# Patient Record
Sex: MALE | Race: WHITE | NOT HISPANIC OR LATINO
[De-identification: names, ages, dates, MRNs, and addresses within clinical notes are randomized per-mention and may not be internally consistent; named-entity substitution may affect disease eponyms.]

---

## 2017-04-20 ENCOUNTER — RX RENEWAL (OUTPATIENT)
Age: 42
End: 2017-04-20

## 2017-06-26 ENCOUNTER — APPOINTMENT (OUTPATIENT)
Dept: INTERNAL MEDICINE | Facility: CLINIC | Age: 42
End: 2017-06-26

## 2017-06-26 VITALS
BODY MASS INDEX: 30.61 KG/M2 | WEIGHT: 195 LBS | SYSTOLIC BLOOD PRESSURE: 146 MMHG | DIASTOLIC BLOOD PRESSURE: 88 MMHG | TEMPERATURE: 97.9 F | HEIGHT: 67 IN

## 2017-06-26 DIAGNOSIS — K62.5 HEMORRHAGE OF ANUS AND RECTUM: ICD-10-CM

## 2017-06-26 DIAGNOSIS — Z80.52 FAMILY HISTORY OF MALIGNANT NEOPLASM OF BLADDER: ICD-10-CM

## 2017-08-28 ENCOUNTER — APPOINTMENT (OUTPATIENT)
Dept: INTERNAL MEDICINE | Facility: CLINIC | Age: 42
End: 2017-08-28
Payer: COMMERCIAL

## 2017-08-28 PROCEDURE — 45378 DIAGNOSTIC COLONOSCOPY: CPT

## 2017-09-06 ENCOUNTER — LABORATORY RESULT (OUTPATIENT)
Age: 42
End: 2017-09-06

## 2017-09-12 ENCOUNTER — APPOINTMENT (OUTPATIENT)
Dept: INTERNAL MEDICINE | Facility: CLINIC | Age: 42
End: 2017-09-12
Payer: COMMERCIAL

## 2017-09-12 VITALS
WEIGHT: 201 LBS | DIASTOLIC BLOOD PRESSURE: 80 MMHG | HEIGHT: 67 IN | TEMPERATURE: 97.9 F | SYSTOLIC BLOOD PRESSURE: 130 MMHG | BODY MASS INDEX: 31.55 KG/M2

## 2017-09-12 VITALS — DIASTOLIC BLOOD PRESSURE: 80 MMHG | SYSTOLIC BLOOD PRESSURE: 110 MMHG

## 2017-09-12 PROCEDURE — 90688 IIV4 VACCINE SPLT 0.5 ML IM: CPT

## 2017-09-12 PROCEDURE — 99396 PREV VISIT EST AGE 40-64: CPT | Mod: 25

## 2017-09-12 PROCEDURE — 94010 BREATHING CAPACITY TEST: CPT

## 2017-09-12 PROCEDURE — 93000 ELECTROCARDIOGRAM COMPLETE: CPT

## 2017-09-12 PROCEDURE — G0008: CPT

## 2017-09-13 ENCOUNTER — NON-APPOINTMENT (OUTPATIENT)
Age: 42
End: 2017-09-13

## 2017-10-07 ENCOUNTER — APPOINTMENT (OUTPATIENT)
Dept: INTERNAL MEDICINE | Facility: CLINIC | Age: 42
End: 2017-10-07

## 2018-08-30 ENCOUNTER — RX RENEWAL (OUTPATIENT)
Age: 43
End: 2018-08-30

## 2019-01-23 ENCOUNTER — LABORATORY RESULT (OUTPATIENT)
Age: 44
End: 2019-01-23

## 2019-01-29 ENCOUNTER — APPOINTMENT (OUTPATIENT)
Dept: INTERNAL MEDICINE | Facility: CLINIC | Age: 44
End: 2019-01-29
Payer: COMMERCIAL

## 2019-01-29 ENCOUNTER — NON-APPOINTMENT (OUTPATIENT)
Age: 44
End: 2019-01-29

## 2019-01-29 VITALS
DIASTOLIC BLOOD PRESSURE: 90 MMHG | TEMPERATURE: 98.3 F | BODY MASS INDEX: 32.49 KG/M2 | SYSTOLIC BLOOD PRESSURE: 120 MMHG | HEIGHT: 67 IN | WEIGHT: 207 LBS

## 2019-01-29 VITALS — SYSTOLIC BLOOD PRESSURE: 122 MMHG | DIASTOLIC BLOOD PRESSURE: 80 MMHG

## 2019-01-29 DIAGNOSIS — K21.0 GASTRO-ESOPHAGEAL REFLUX DISEASE WITH ESOPHAGITIS: ICD-10-CM

## 2019-01-29 PROCEDURE — 93000 ELECTROCARDIOGRAM COMPLETE: CPT

## 2019-01-29 PROCEDURE — 99396 PREV VISIT EST AGE 40-64: CPT | Mod: 25

## 2019-01-29 PROCEDURE — 71046 X-RAY EXAM CHEST 2 VIEWS: CPT

## 2019-01-29 PROCEDURE — 94010 BREATHING CAPACITY TEST: CPT

## 2019-01-29 NOTE — ASSESSMENT
[FreeTextEntry1] : \par \par \par ecg----SR; WNL\par cxr---pa/lat---NAPD\par pfts---WNL\par \par imp--hypertension---normotensive on meds\par         hypercholesterolemia---borderline LDLs\par         obesity---BMI=31.42\par         GERD---asx. on ranitidine\par \par plan---FBW reviewed with pt\par           dietary counseling re: weight loss; weight watchers recommended\par           trial Flonase\par           continue current meds\par           annual CPE

## 2019-01-29 NOTE — HEALTH RISK ASSESSMENT
[Excellent] : ~his/her~  mood as  excellent [No falls in past year] : Patient reported no falls in the past year [0] : 2) Feeling down, depressed, or hopeless: Not at all (0) [None] : None [] :  [Feels Safe at Home] : Feels safe at home [Fully functional (bathing, dressing, toileting, transferring, walking, feeding)] : Fully functional (bathing, dressing, toileting, transferring, walking, feeding) [Fully functional (using the telephone, shopping, preparing meals, housekeeping, doing laundry, using] : Fully functional and needs no help or supervision to perform IADLs (using the telephone, shopping, preparing meals, housekeeping, doing laundry, using transportation, managing medications and managing finances) [Discussed at today's visit] : Advance Directives Discussed at today's visit [FreeTextEntry1] : none [] : No [de-identified] : none [de-identified] : occ. 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

## 2019-01-29 NOTE — HISTORY OF PRESENT ILLNESS
[FreeTextEntry1] : here for CPE [de-identified] : \par sinuses bothersome.....congestion; pressure; using hypertonic saline\par \par wants to check titers of childhood vaccines

## 2019-01-29 NOTE — PHYSICAL EXAM
[No Acute Distress] : no acute distress [Well Nourished] : well nourished [Well Developed] : well developed [Well-Appearing] : well-appearing [Normal Sclera/Conjunctiva] : normal sclera/conjunctiva [PERRL] : pupils equal round and reactive to light [EOMI] : extraocular movements intact [Normal Outer Ear/Nose] : the outer ears and nose were normal in appearance [Normal Oropharynx] : the oropharynx was normal [No JVD] : no jugular venous distention [Supple] : supple [No Lymphadenopathy] : no lymphadenopathy [Thyroid Normal, No Nodules] : the thyroid was normal and there were no nodules present [No Respiratory Distress] : no respiratory distress  [Clear to Auscultation] : lungs were clear to auscultation bilaterally [No Accessory Muscle Use] : no accessory muscle use [Normal Rate] : normal rate  [Regular Rhythm] : with a regular rhythm [Normal S1, S2] : normal S1 and S2 [No Murmur] : no murmur heard [No Carotid Bruits] : no carotid bruits [No Abdominal Bruit] : a ~M bruit was not heard ~T in the abdomen [No Varicosities] : no varicosities [Pedal Pulses Present] : the pedal pulses are present [No Edema] : there was no peripheral edema [No Extremity Clubbing/Cyanosis] : no extremity clubbing/cyanosis [No Palpable Aorta] : no palpable aorta [Soft] : abdomen soft [Non Tender] : non-tender [Non-distended] : non-distended [No Masses] : no abdominal mass palpated [No HSM] : no HSM [Normal Bowel Sounds] : normal bowel sounds [Normal Sphincter Tone] : normal sphincter tone [No Mass] : no mass [Rectal Exam - Rectum] : digital rectal exam was normal [Prostate Enlargement] : the prostate was not enlarged [Prostate Tenderness] : the prostate was not tender [No Prostate Nodules] : no prostate nodules [Normal Posterior Cervical Nodes] : no posterior cervical lymphadenopathy [Normal Anterior Cervical Nodes] : no anterior cervical lymphadenopathy [No CVA Tenderness] : no CVA  tenderness [No Spinal Tenderness] : no spinal tenderness [No Joint Swelling] : no joint swelling [Grossly Normal Strength/Tone] : grossly normal strength/tone [No Rash] : no rash [Normal Gait] : normal gait [Coordination Grossly Intact] : coordination grossly intact [No Focal Deficits] : no focal deficits [Deep Tendon Reflexes (DTR)] : deep tendon reflexes were 2+ and symmetric [Normal Affect] : the affect was normal [Normal Insight/Judgement] : insight and judgment were intact [Stool Occult Blood] : stool negative for occult blood

## 2019-06-27 ENCOUNTER — RX RENEWAL (OUTPATIENT)
Age: 44
End: 2019-06-27

## 2019-11-04 ENCOUNTER — TRANSCRIPTION ENCOUNTER (OUTPATIENT)
Age: 44
End: 2019-11-04

## 2019-11-05 ENCOUNTER — MEDICATION RENEWAL (OUTPATIENT)
Age: 44
End: 2019-11-05

## 2019-11-05 RX ORDER — FAMOTIDINE 20 MG/1
20 TABLET, FILM COATED ORAL
Qty: 180 | Refills: 3 | Status: ACTIVE | COMMUNITY
Start: 2019-11-05 | End: 1900-01-01

## 2020-02-27 ENCOUNTER — TRANSCRIPTION ENCOUNTER (OUTPATIENT)
Age: 45
End: 2020-02-27

## 2020-04-08 ENCOUNTER — APPOINTMENT (OUTPATIENT)
Dept: GASTROENTEROLOGY | Facility: CLINIC | Age: 45
End: 2020-04-08

## 2020-06-08 ENCOUNTER — LABORATORY RESULT (OUTPATIENT)
Age: 45
End: 2020-06-08

## 2020-06-08 ENCOUNTER — APPOINTMENT (OUTPATIENT)
Dept: INTERNAL MEDICINE | Facility: CLINIC | Age: 45
End: 2020-06-08
Payer: COMMERCIAL

## 2020-06-08 VITALS
BODY MASS INDEX: 31.39 KG/M2 | HEIGHT: 67 IN | DIASTOLIC BLOOD PRESSURE: 90 MMHG | WEIGHT: 200 LBS | SYSTOLIC BLOOD PRESSURE: 130 MMHG | TEMPERATURE: 98.5 F

## 2020-06-08 VITALS — DIASTOLIC BLOOD PRESSURE: 80 MMHG | SYSTOLIC BLOOD PRESSURE: 132 MMHG

## 2020-06-08 DIAGNOSIS — K21.0 GASTRO-ESOPHAGEAL REFLUX DISEASE WITH ESOPHAGITIS: ICD-10-CM

## 2020-06-08 PROCEDURE — 93000 ELECTROCARDIOGRAM COMPLETE: CPT

## 2020-06-08 PROCEDURE — 99396 PREV VISIT EST AGE 40-64: CPT | Mod: 25

## 2020-06-08 PROCEDURE — 71046 X-RAY EXAM CHEST 2 VIEWS: CPT

## 2020-06-08 RX ORDER — RANITIDINE 150 MG/1
150 TABLET ORAL
Qty: 60 | Refills: 0 | Status: DISCONTINUED | COMMUNITY
Start: 2017-06-26 | End: 2020-06-08

## 2020-06-08 NOTE — PHYSICAL EXAM
[No Acute Distress] : no acute distress [Well Nourished] : well nourished [Well Developed] : well developed [Normal Sclera/Conjunctiva] : normal sclera/conjunctiva [Well-Appearing] : well-appearing [PERRL] : pupils equal round and reactive to light [EOMI] : extraocular movements intact [Normal Outer Ear/Nose] : the outer ears and nose were normal in appearance [Normal Oropharynx] : the oropharynx was normal [No Lymphadenopathy] : no lymphadenopathy [No JVD] : no jugular venous distention [Supple] : supple [No Respiratory Distress] : no respiratory distress  [Thyroid Normal, No Nodules] : the thyroid was normal and there were no nodules present [No Accessory Muscle Use] : no accessory muscle use [Normal Rate] : normal rate  [Clear to Auscultation] : lungs were clear to auscultation bilaterally [Normal S1, S2] : normal S1 and S2 [Regular Rhythm] : with a regular rhythm [No Murmur] : no murmur heard [No Carotid Bruits] : no carotid bruits [No Varicosities] : no varicosities [No Abdominal Bruit] : a ~M bruit was not heard ~T in the abdomen [Pedal Pulses Present] : the pedal pulses are present [No Edema] : there was no peripheral edema [No Palpable Aorta] : no palpable aorta [No Extremity Clubbing/Cyanosis] : no extremity clubbing/cyanosis [Soft] : abdomen soft [Non Tender] : non-tender [Non-distended] : non-distended [No HSM] : no HSM [No Masses] : no abdominal mass palpated [Normal Bowel Sounds] : normal bowel sounds [Rectal Exam - Rectum] : digital rectal exam was normal [Prostate Enlargement] : the prostate was not enlarged [Prostate Tenderness] : the prostate was not tender [Normal Posterior Cervical Nodes] : no posterior cervical lymphadenopathy [No Prostate Nodules] : no prostate nodules [Normal Anterior Cervical Nodes] : no anterior cervical lymphadenopathy [No CVA Tenderness] : no CVA  tenderness [No Spinal Tenderness] : no spinal tenderness [Grossly Normal Strength/Tone] : grossly normal strength/tone [No Joint Swelling] : no joint swelling [Coordination Grossly Intact] : coordination grossly intact [No Rash] : no rash [Normal Gait] : normal gait [No Focal Deficits] : no focal deficits [Deep Tendon Reflexes (DTR)] : deep tendon reflexes were 2+ and symmetric [Normal Affect] : the affect was normal [Normal Insight/Judgement] : insight and judgment were intact

## 2020-06-08 NOTE — ASSESSMENT
[FreeTextEntry1] : \par \par ecg---SR; WNL\par cxr---NAPD\par \par imp--hypertension---normotensive at rest\par         GERD---mildly symptomatic on famotidine\par         obesity---BMI=31; interval 7# weight loss\par         hypercholesterolemia---panel to be checked on statin\par \par plan---FBW per CPE\par            continued weight loss encouraged\par            endoscopy when we resume\par            annual CPE

## 2020-06-08 NOTE — HISTORY OF PRESENT ILLNESS
[FreeTextEntry1] : \par \par here for CPE [de-identified] : \par generally well;\par \par GERD---occ. sx.; on famotidine; no recent change; was sched. for endo but got cancelled due to Covid situation\par \par asthma---only associated with exposure to cats; has albuterol inhaler which he has not used in several years

## 2020-06-08 NOTE — HEALTH RISK ASSESSMENT
[Very Good] : ~his/her~  mood as very good [Yes] : Yes [2 - 4 times a month (2 pts)] : 2-4 times a month (2 points) [1 or 2 (0 pts)] : 1 or 2 (0 points) [Never (0 pts)] : Never (0 points) [No] : In the past 12 months have you used drugs other than those required for medical reasons? No [No falls in past year] : Patient reported no falls in the past year [0] : 2) Feeling down, depressed, or hopeless: Not at all (0) [None] : None [# of Members in Household ___] :  household currently consist of [unfilled] member(s) [With Family] : lives with family [Employed] : employed [I will adhere to the patient's wishes as expressed in the advance directive except as noted below.] : I will adhere to the patient's wishes as expressed in the advance directive except as noted below [] : No [de-identified] : none [de-identified] : walking 4x per week [Change in mental status noted] : No change in mental status noted [de-identified] : ad rakan [Reports changes in dental health] : Reports no changes in dental health [Reports changes in vision] : Reports no changes in vision [ColonoscopyDate] : 08/17 [ColonoscopyComments] : f/u in 5 yrs. 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

## 2020-06-09 ENCOUNTER — TRANSCRIPTION ENCOUNTER (OUTPATIENT)
Age: 45
End: 2020-06-09

## 2020-06-09 DIAGNOSIS — Z11.59 ENCOUNTER FOR SCREENING FOR OTHER VIRAL DISEASES: ICD-10-CM

## 2020-06-09 LAB
ALBUMIN SERPL ELPH-MCNC: 5.2 G/DL
ALP BLD-CCNC: 74 U/L
ALT SERPL-CCNC: 25 U/L
ANION GAP SERPL CALC-SCNC: 18 MMOL/L
APPEARANCE: CLEAR
AST SERPL-CCNC: 21 U/L
BACTERIA: NEGATIVE
BASOPHILS # BLD AUTO: 0.02 K/UL
BASOPHILS NFR BLD AUTO: 0.3 %
BILIRUB SERPL-MCNC: 0.6 MG/DL
BILIRUBIN URINE: NEGATIVE
BLOOD URINE: NEGATIVE
BUN SERPL-MCNC: 11 MG/DL
CALCIUM SERPL-MCNC: 10 MG/DL
CHLORIDE SERPL-SCNC: 99 MMOL/L
CHOLEST SERPL-MCNC: 200 MG/DL
CHOLEST/HDLC SERPL: 3.3 RATIO
CO2 SERPL-SCNC: 22 MMOL/L
COLOR: YELLOW
CREAT SERPL-MCNC: 1.02 MG/DL
EOSINOPHIL # BLD AUTO: 0.12 K/UL
EOSINOPHIL NFR BLD AUTO: 1.9 %
ERYTHROCYTE [SEDIMENTATION RATE] IN BLOOD BY WESTERGREN METHOD: 19 MM/HR
GLUCOSE QUALITATIVE U: NEGATIVE
GLUCOSE SERPL-MCNC: 56 MG/DL
HCT VFR BLD CALC: 48.2 %
HDLC SERPL-MCNC: 61 MG/DL
HGB BLD-MCNC: 15.8 G/DL
HYALINE CASTS: 0 /LPF
IMM GRANULOCYTES NFR BLD AUTO: 0.2 %
KETONES URINE: NEGATIVE
LDLC SERPL CALC-MCNC: 117 MG/DL
LDLC SERPL DIRECT ASSAY-MCNC: 122 MG/DL
LEUKOCYTE ESTERASE URINE: NEGATIVE
LYMPHOCYTES # BLD AUTO: 2.76 K/UL
LYMPHOCYTES NFR BLD AUTO: 44.6 %
MAN DIFF?: NORMAL
MCHC RBC-ENTMCNC: 29.5 PG
MCHC RBC-ENTMCNC: 32.8 GM/DL
MCV RBC AUTO: 89.9 FL
MICROSCOPIC-UA: NORMAL
MONOCYTES # BLD AUTO: 0.36 K/UL
MONOCYTES NFR BLD AUTO: 5.8 %
NEUTROPHILS # BLD AUTO: 2.92 K/UL
NEUTROPHILS NFR BLD AUTO: 47.2 %
NITRITE URINE: NEGATIVE
PH URINE: 6.5
PLATELET # BLD AUTO: 245 K/UL
PLATELET # PLAS AUTO: 182 K/UL
POTASSIUM SERPL-SCNC: 4.2 MMOL/L
PROT SERPL-MCNC: 7.1 G/DL
PROTEIN URINE: NEGATIVE
PSA SERPL-MCNC: 0.55 NG/ML
RBC # BLD: 5.36 M/UL
RBC # FLD: 13.1 %
RED BLOOD CELLS URINE: 1 /HPF
SAVE SPECIMEN: NORMAL
SODIUM SERPL-SCNC: 139 MMOL/L
SPECIFIC GRAVITY URINE: 1.02
SQUAMOUS EPITHELIAL CELLS: 0 /HPF
T4 FREE SERPL-MCNC: 1.2 NG/DL
TRIGL SERPL-MCNC: 106 MG/DL
TSH SERPL-ACNC: 2.16 UIU/ML
UROBILINOGEN URINE: NORMAL
WBC # FLD AUTO: 6.19 K/UL
WHITE BLOOD CELLS URINE: 0 /HPF

## 2020-07-10 ENCOUNTER — NON-APPOINTMENT (OUTPATIENT)
Age: 45
End: 2020-07-10

## 2020-09-05 LAB
ALBUMIN SERPL ELPH-MCNC: 4.7 G/DL
ALP BLD-CCNC: 71 U/L
ALT SERPL-CCNC: 49 U/L
ANION GAP SERPL CALC-SCNC: 15 MMOL/L
AST SERPL-CCNC: 30 U/L
BILIRUB SERPL-MCNC: 0.7 MG/DL
BUN SERPL-MCNC: 10 MG/DL
CALCIUM SERPL-MCNC: 9.8 MG/DL
CHLORIDE SERPL-SCNC: 102 MMOL/L
CHOLEST SERPL-MCNC: 132 MG/DL
CHOLEST/HDLC SERPL: 2.3 RATIO
CO2 SERPL-SCNC: 24 MMOL/L
CREAT SERPL-MCNC: 0.87 MG/DL
GLUCOSE SERPL-MCNC: 58 MG/DL
HDLC SERPL-MCNC: 58 MG/DL
LDLC SERPL CALC-MCNC: 58 MG/DL
LDLC SERPL DIRECT ASSAY-MCNC: 58 MG/DL
POTASSIUM SERPL-SCNC: 4 MMOL/L
PROT SERPL-MCNC: 6.6 G/DL
SAVE SPECIMEN: NORMAL
SODIUM SERPL-SCNC: 141 MMOL/L
TRIGL SERPL-MCNC: 81 MG/DL

## 2020-09-08 ENCOUNTER — TRANSCRIPTION ENCOUNTER (OUTPATIENT)
Age: 45
End: 2020-09-08

## 2020-09-10 ENCOUNTER — APPOINTMENT (OUTPATIENT)
Dept: ULTRASOUND IMAGING | Facility: CLINIC | Age: 45
End: 2020-09-10

## 2020-09-21 ENCOUNTER — TRANSCRIPTION ENCOUNTER (OUTPATIENT)
Age: 45
End: 2020-09-21

## 2020-09-26 ENCOUNTER — OUTPATIENT (OUTPATIENT)
Dept: OUTPATIENT SERVICES | Facility: HOSPITAL | Age: 45
LOS: 1 days | End: 2020-09-26
Payer: COMMERCIAL

## 2020-09-26 ENCOUNTER — APPOINTMENT (OUTPATIENT)
Dept: ULTRASOUND IMAGING | Facility: CLINIC | Age: 45
End: 2020-09-26
Payer: COMMERCIAL

## 2020-09-26 DIAGNOSIS — R94.5 ABNORMAL RESULTS OF LIVER FUNCTION STUDIES: ICD-10-CM

## 2020-09-26 PROCEDURE — 76700 US EXAM ABDOM COMPLETE: CPT

## 2020-09-26 PROCEDURE — 76700 US EXAM ABDOM COMPLETE: CPT | Mod: 26

## 2020-10-06 LAB
ALBUMIN SERPL ELPH-MCNC: 5.1 G/DL
ALP BLD-CCNC: 86 U/L
ALT SERPL-CCNC: 59 U/L
ANION GAP SERPL CALC-SCNC: 22 MMOL/L
AST SERPL-CCNC: 32 U/L
BILIRUB SERPL-MCNC: 0.5 MG/DL
BUN SERPL-MCNC: 13 MG/DL
CALCIUM SERPL-MCNC: 10.1 MG/DL
CHLORIDE SERPL-SCNC: 96 MMOL/L
CO2 SERPL-SCNC: 23 MMOL/L
CREAT SERPL-MCNC: 0.89 MG/DL
GLUCOSE SERPL-MCNC: 23 MG/DL
POTASSIUM SERPL-SCNC: 4.1 MMOL/L
PROT SERPL-MCNC: 7.4 G/DL
SAVE SPECIMEN: NORMAL
SODIUM SERPL-SCNC: 141 MMOL/L

## 2020-11-05 ENCOUNTER — TRANSCRIPTION ENCOUNTER (OUTPATIENT)
Age: 45
End: 2020-11-05

## 2020-11-10 LAB
A1AT SERPL-MCNC: 111 MG/DL
ALBUMIN SERPL ELPH-MCNC: 5 G/DL
ALP BLD-CCNC: 83 U/L
ALT SERPL-CCNC: 52 U/L
ANION GAP SERPL CALC-SCNC: 16 MMOL/L
AST SERPL-CCNC: 26 U/L
BILIRUB SERPL-MCNC: 0.8 MG/DL
BUN SERPL-MCNC: 12 MG/DL
CALCIUM SERPL-MCNC: 10 MG/DL
CERULOPLASMIN SERPL-MCNC: 22 MG/DL
CHLORIDE SERPL-SCNC: 98 MMOL/L
CO2 SERPL-SCNC: 23 MMOL/L
CREAT SERPL-MCNC: 0.85 MG/DL
FERRITIN SERPL-MCNC: 313 NG/ML
GLUCOSE SERPL-MCNC: 93 MG/DL
HBV SURFACE AG SER QL: NONREACTIVE
HCV AB SER QL: NONREACTIVE
HCV S/CO RATIO: 0.07 S/CO
IRON SATN MFR SERPL: 56 %
IRON SERPL-MCNC: 165 UG/DL
MITOCHONDRIA AB SER IF-ACNC: NORMAL
POTASSIUM SERPL-SCNC: 4.5 MMOL/L
PROT SERPL-MCNC: 7 G/DL
SMOOTH MUSCLE AB SER QL IF: ABNORMAL
SODIUM SERPL-SCNC: 138 MMOL/L
TIBC SERPL-MCNC: 292 UG/DL
UIBC SERPL-MCNC: 127 UG/DL

## 2020-11-11 ENCOUNTER — TRANSCRIPTION ENCOUNTER (OUTPATIENT)
Age: 45
End: 2020-11-11

## 2020-11-11 LAB — ANA SER IF-ACNC: NEGATIVE

## 2020-11-12 DIAGNOSIS — Z01.818 ENCOUNTER FOR OTHER PREPROCEDURAL EXAMINATION: ICD-10-CM

## 2020-11-13 ENCOUNTER — APPOINTMENT (OUTPATIENT)
Dept: DISASTER EMERGENCY | Facility: CLINIC | Age: 45
End: 2020-11-13

## 2020-11-14 LAB — SARS-COV-2 N GENE NPH QL NAA+PROBE: NOT DETECTED

## 2020-11-16 ENCOUNTER — APPOINTMENT (OUTPATIENT)
Dept: GASTROENTEROLOGY | Facility: CLINIC | Age: 45
End: 2020-11-16
Payer: COMMERCIAL

## 2020-11-16 ENCOUNTER — LABORATORY RESULT (OUTPATIENT)
Age: 45
End: 2020-11-16

## 2020-11-16 PROCEDURE — 43239 EGD BIOPSY SINGLE/MULTIPLE: CPT

## 2020-11-16 PROCEDURE — 99072 ADDL SUPL MATRL&STAF TM PHE: CPT

## 2020-12-10 ENCOUNTER — LABORATORY RESULT (OUTPATIENT)
Age: 45
End: 2020-12-10

## 2021-01-17 LAB
ALBUMIN SERPL ELPH-MCNC: 5 G/DL
ALP BLD-CCNC: 83 U/L
ALT SERPL-CCNC: 80 U/L
ANION GAP SERPL CALC-SCNC: 15 MMOL/L
AST SERPL-CCNC: 32 U/L
BILIRUB SERPL-MCNC: 0.7 MG/DL
BUN SERPL-MCNC: 12 MG/DL
CALCIUM SERPL-MCNC: 10.2 MG/DL
CHLORIDE SERPL-SCNC: 99 MMOL/L
CO2 SERPL-SCNC: 23 MMOL/L
CREAT SERPL-MCNC: 0.99 MG/DL
GLUCOSE SERPL-MCNC: 86 MG/DL
POTASSIUM SERPL-SCNC: 4.8 MMOL/L
PROT SERPL-MCNC: 7.2 G/DL
SAVE SPECIMEN: NORMAL
SODIUM SERPL-SCNC: 138 MMOL/L

## 2021-02-10 ENCOUNTER — TRANSCRIPTION ENCOUNTER (OUTPATIENT)
Age: 46
End: 2021-02-10

## 2021-05-16 ENCOUNTER — RX RENEWAL (OUTPATIENT)
Age: 46
End: 2021-05-16

## 2021-06-10 LAB
ALBUMIN SERPL ELPH-MCNC: 5 G/DL
ALP BLD-CCNC: 76 U/L
ALT SERPL-CCNC: 34 U/L
ANION GAP SERPL CALC-SCNC: 12 MMOL/L
APPEARANCE: CLEAR
AST SERPL-CCNC: 24 U/L
BACTERIA: NEGATIVE
BASOPHILS # BLD AUTO: 0.02 K/UL
BASOPHILS NFR BLD AUTO: 0.3 %
BILIRUB SERPL-MCNC: 0.6 MG/DL
BILIRUBIN URINE: NEGATIVE
BLOOD URINE: NEGATIVE
BUN SERPL-MCNC: 13 MG/DL
CALCIUM SERPL-MCNC: 9.9 MG/DL
CHLORIDE SERPL-SCNC: 100 MMOL/L
CHOLEST SERPL-MCNC: 199 MG/DL
CO2 SERPL-SCNC: 25 MMOL/L
COLOR: NORMAL
CREAT SERPL-MCNC: 0.98 MG/DL
EOSINOPHIL # BLD AUTO: 0.07 K/UL
EOSINOPHIL NFR BLD AUTO: 1.2 %
ERYTHROCYTE [SEDIMENTATION RATE] IN BLOOD BY WESTERGREN METHOD: 9 MM/HR
GLUCOSE QUALITATIVE U: NEGATIVE
GLUCOSE SERPL-MCNC: 91 MG/DL
HCT VFR BLD CALC: 46.9 %
HDLC SERPL-MCNC: 60 MG/DL
HGB BLD-MCNC: 15.5 G/DL
HYALINE CASTS: 0 /LPF
IMM GRANULOCYTES NFR BLD AUTO: 0.2 %
KETONES URINE: NEGATIVE
LDLC SERPL CALC-MCNC: 117 MG/DL
LDLC SERPL DIRECT ASSAY-MCNC: 123 MG/DL
LEUKOCYTE ESTERASE URINE: NEGATIVE
LYMPHOCYTES # BLD AUTO: 2.34 K/UL
LYMPHOCYTES NFR BLD AUTO: 40 %
MAN DIFF?: NORMAL
MCHC RBC-ENTMCNC: 30 PG
MCHC RBC-ENTMCNC: 33 GM/DL
MCV RBC AUTO: 90.9 FL
MICROSCOPIC-UA: NORMAL
MONOCYTES # BLD AUTO: 0.42 K/UL
MONOCYTES NFR BLD AUTO: 7.2 %
NEUTROPHILS # BLD AUTO: 2.99 K/UL
NEUTROPHILS NFR BLD AUTO: 51.1 %
NITRITE URINE: NEGATIVE
NONHDLC SERPL-MCNC: 139 MG/DL
PH URINE: 6.5
PLATELET # BLD AUTO: 222 K/UL
POTASSIUM SERPL-SCNC: 5.2 MMOL/L
PROT SERPL-MCNC: 7 G/DL
PROTEIN URINE: NEGATIVE
PSA SERPL-MCNC: 0.52 NG/ML
RBC # BLD: 5.16 M/UL
RBC # FLD: 12.9 %
RED BLOOD CELLS URINE: 1 /HPF
SODIUM SERPL-SCNC: 137 MMOL/L
SPECIFIC GRAVITY URINE: 1.01
SQUAMOUS EPITHELIAL CELLS: 0 /HPF
T4 FREE SERPL-MCNC: 1.3 NG/DL
TRIGL SERPL-MCNC: 111 MG/DL
TSH SERPL-ACNC: 1.84 UIU/ML
UROBILINOGEN URINE: NORMAL
WBC # FLD AUTO: 5.85 K/UL
WHITE BLOOD CELLS URINE: 0 /HPF

## 2021-06-14 ENCOUNTER — APPOINTMENT (OUTPATIENT)
Dept: INTERNAL MEDICINE | Facility: CLINIC | Age: 46
End: 2021-06-14
Payer: COMMERCIAL

## 2021-06-14 VITALS
SYSTOLIC BLOOD PRESSURE: 130 MMHG | HEART RATE: 67 BPM | BODY MASS INDEX: 32.02 KG/M2 | DIASTOLIC BLOOD PRESSURE: 75 MMHG | HEIGHT: 67 IN | WEIGHT: 204 LBS | OXYGEN SATURATION: 98 % | TEMPERATURE: 98 F

## 2021-06-14 VITALS — SYSTOLIC BLOOD PRESSURE: 140 MMHG | DIASTOLIC BLOOD PRESSURE: 80 MMHG

## 2021-06-14 VITALS — DIASTOLIC BLOOD PRESSURE: 90 MMHG | SYSTOLIC BLOOD PRESSURE: 152 MMHG

## 2021-06-14 PROCEDURE — 99072 ADDL SUPL MATRL&STAF TM PHE: CPT

## 2021-06-14 PROCEDURE — 71046 X-RAY EXAM CHEST 2 VIEWS: CPT

## 2021-06-14 PROCEDURE — 99396 PREV VISIT EST AGE 40-64: CPT | Mod: 25

## 2021-06-14 PROCEDURE — 93000 ELECTROCARDIOGRAM COMPLETE: CPT

## 2021-06-14 NOTE — PHYSICAL EXAM
[No Acute Distress] : no acute distress [Well Developed] : well developed [Well Nourished] : well nourished [Well-Appearing] : well-appearing [Normal Sclera/Conjunctiva] : normal sclera/conjunctiva [PERRL] : pupils equal round and reactive to light [EOMI] : extraocular movements intact [Normal Outer Ear/Nose] : the outer ears and nose were normal in appearance [Normal Oropharynx] : the oropharynx was normal [No JVD] : no jugular venous distention [No Lymphadenopathy] : no lymphadenopathy [Supple] : supple [Thyroid Normal, No Nodules] : the thyroid was normal and there were no nodules present [No Respiratory Distress] : no respiratory distress  [No Accessory Muscle Use] : no accessory muscle use [Clear to Auscultation] : lungs were clear to auscultation bilaterally [Normal Rate] : normal rate  [Regular Rhythm] : with a regular rhythm [Normal S1, S2] : normal S1 and S2 [No Murmur] : no murmur heard [No Carotid Bruits] : no carotid bruits [No Abdominal Bruit] : a ~M bruit was not heard ~T in the abdomen [No Varicosities] : no varicosities [Pedal Pulses Present] : the pedal pulses are present [No Edema] : there was no peripheral edema [No Palpable Aorta] : no palpable aorta [No Extremity Clubbing/Cyanosis] : no extremity clubbing/cyanosis [Non Tender] : non-tender [Soft] : abdomen soft [Non-distended] : non-distended [No Masses] : no abdominal mass palpated [No HSM] : no HSM [Normal Bowel Sounds] : normal bowel sounds [Prostate Enlargement] : the prostate was not enlarged [Rectal Exam - Rectum] : digital rectal exam was normal [Prostate Tenderness] : the prostate was not tender [No Prostate Nodules] : no prostate nodules [Normal Posterior Cervical Nodes] : no posterior cervical lymphadenopathy [Normal Anterior Cervical Nodes] : no anterior cervical lymphadenopathy [No CVA Tenderness] : no CVA  tenderness [No Spinal Tenderness] : no spinal tenderness [No Joint Swelling] : no joint swelling [Grossly Normal Strength/Tone] : grossly normal strength/tone [No Rash] : no rash [Coordination Grossly Intact] : coordination grossly intact [No Focal Deficits] : no focal deficits [Normal Gait] : normal gait [Deep Tendon Reflexes (DTR)] : deep tendon reflexes were 2+ and symmetric [Normal Affect] : the affect was normal [Normal Insight/Judgement] : insight and judgment were intact

## 2021-06-14 NOTE — HEALTH RISK ASSESSMENT
[Very Good] : ~his/her~  mood as very good [Yes] : Yes [No falls in past year] : Patient reported no falls in the past year [0] : 2) Feeling down, depressed, or hopeless: Not at all (0) [Patient reported colonoscopy was normal] : Patient reported colonoscopy was normal [HIV Test offered] : HIV Test offered [Hepatitis C test offered] : Hepatitis C test offered [None] : None [With Family] : lives with family [Employed] : employed [College] : College [] :  [# Of Children ___] : has [unfilled] children [Sexually Active] : sexually active [Feels Safe at Home] : Feels safe at home [Fully functional (bathing, dressing, toileting, transferring, walking, feeding)] : Fully functional (bathing, dressing, toileting, transferring, walking, feeding) [Fully functional (using the telephone, shopping, preparing meals, housekeeping, doing laundry, using] : Fully functional and needs no help or supervision to perform IADLs (using the telephone, shopping, preparing meals, housekeeping, doing laundry, using transportation, managing medications and managing finances) [Reports normal functional visual acuity (ie: able to read med bottle)] : Reports normal functional visual acuity [Smoke Detector] : smoke detector [Carbon Monoxide Detector] : carbon monoxide detector [Seat Belt] :  uses seat belt [Sunscreen] : uses sunscreen [] : No [de-identified] : anami. (upper endoscopy) [de-identified] : once per week, may have 1-2 glasses [de-identified] : 4x per week....indoor bike [de-identified] : ad rakan [Change in mental status noted] : No change in mental status noted [Reports changes in hearing] : Reports no changes in hearing [High Risk Behavior] : no high risk behavior [Reports changes in vision] : Reports no changes in vision [Reports changes in dental health] : Reports no changes in dental health [Guns at Home] : no guns at home [Travel to Developing Areas] : does not  travel to developing areas [TB Exposure] : is not being exposed to tuberculosis [ColonoscopyDate] : 08/17 [ColonoscopyComments] : 5 yr. 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

## 2021-06-14 NOTE — HISTORY OF PRESENT ILLNESS
[FreeTextEntry1] : \par \par here for CPE [de-identified] : \par generally well;\par \par off statin due to abn. LFTs; hasn't been able to schedule a g.i. consult with Dr. Pinto

## 2021-06-14 NOTE — ASSESSMENT
[FreeTextEntry1] : \par \par ecg done in office for CPE---SR; rsr'; NSCSPT\par cxr done in office for CPE---pa/lat.---NAPD\par \par imp---hypertension--mildly elevated systolic BPs\par          obesity--BMI=31.95; no change\par          abnormal LFTs---currently normal; off statin\par          asthma---quiescent\par \par plan---FBW reviewed with pt.\par            continue off statin pending consultation with Dr. Pinto\par            ? retry different statin\par            dietary counseling re: weight loss\par            to record home BPs and send in\par \par

## 2021-06-15 ENCOUNTER — NON-APPOINTMENT (OUTPATIENT)
Age: 46
End: 2021-06-15

## 2021-06-23 ENCOUNTER — TRANSCRIPTION ENCOUNTER (OUTPATIENT)
Age: 46
End: 2021-06-23

## 2021-06-24 ENCOUNTER — TRANSCRIPTION ENCOUNTER (OUTPATIENT)
Age: 46
End: 2021-06-24

## 2022-04-11 PROBLEM — Z11.59 SCREENING FOR VIRAL DISEASE: Status: ACTIVE | Noted: 2020-06-09

## 2022-05-13 ENCOUNTER — RX RENEWAL (OUTPATIENT)
Age: 47
End: 2022-05-13

## 2022-05-27 ENCOUNTER — RX RENEWAL (OUTPATIENT)
Age: 47
End: 2022-05-27

## 2022-06-16 LAB
ALBUMIN SERPL ELPH-MCNC: 5 G/DL
ALP BLD-CCNC: 75 U/L
ALT SERPL-CCNC: 24 U/L
ANION GAP SERPL CALC-SCNC: 16 MMOL/L
APPEARANCE: CLEAR
AST SERPL-CCNC: 20 U/L
BACTERIA: NEGATIVE
BASOPHILS # BLD AUTO: 0.04 K/UL
BASOPHILS NFR BLD AUTO: 0.7 %
BILIRUB SERPL-MCNC: 0.5 MG/DL
BILIRUBIN URINE: NEGATIVE
BLOOD URINE: NEGATIVE
BUN SERPL-MCNC: 13 MG/DL
CALCIUM SERPL-MCNC: 10.1 MG/DL
CHLORIDE SERPL-SCNC: 97 MMOL/L
CHOLEST SERPL-MCNC: 218 MG/DL
CO2 SERPL-SCNC: 23 MMOL/L
COLOR: NORMAL
CREAT SERPL-MCNC: 0.99 MG/DL
EGFR: 96 ML/MIN/1.73M2
EOSINOPHIL # BLD AUTO: 0.08 K/UL
EOSINOPHIL NFR BLD AUTO: 1.4 %
ERYTHROCYTE [SEDIMENTATION RATE] IN BLOOD BY WESTERGREN METHOD: 15 MM/HR
GLUCOSE QUALITATIVE U: NEGATIVE
GLUCOSE SERPL-MCNC: 93 MG/DL
HCT VFR BLD CALC: 46.9 %
HDLC SERPL-MCNC: 63 MG/DL
HGB BLD-MCNC: 15.6 G/DL
HYALINE CASTS: 0 /LPF
IMM GRANULOCYTES NFR BLD AUTO: 0.2 %
KETONES URINE: NEGATIVE
LDLC SERPL CALC-MCNC: 134 MG/DL
LDLC SERPL DIRECT ASSAY-MCNC: 130 MG/DL
LEUKOCYTE ESTERASE URINE: NEGATIVE
LYMPHOCYTES # BLD AUTO: 2.21 K/UL
LYMPHOCYTES NFR BLD AUTO: 39.7 %
MAN DIFF?: NORMAL
MCHC RBC-ENTMCNC: 29.9 PG
MCHC RBC-ENTMCNC: 33.3 GM/DL
MCV RBC AUTO: 89.8 FL
MICROSCOPIC-UA: NORMAL
MONOCYTES # BLD AUTO: 0.35 K/UL
MONOCYTES NFR BLD AUTO: 6.3 %
NEUTROPHILS # BLD AUTO: 2.88 K/UL
NEUTROPHILS NFR BLD AUTO: 51.7 %
NITRITE URINE: NEGATIVE
NONHDLC SERPL-MCNC: 155 MG/DL
PH URINE: 6.5
PLATELET # BLD AUTO: 250 K/UL
POTASSIUM SERPL-SCNC: 4.8 MMOL/L
PROT SERPL-MCNC: 7.3 G/DL
PROTEIN URINE: NEGATIVE
RBC # BLD: 5.22 M/UL
RBC # FLD: 13 %
RED BLOOD CELLS URINE: 1 /HPF
SODIUM SERPL-SCNC: 136 MMOL/L
SPECIFIC GRAVITY URINE: 1.01
SQUAMOUS EPITHELIAL CELLS: 0 /HPF
TRIGL SERPL-MCNC: 103 MG/DL
UROBILINOGEN URINE: NORMAL
WBC # FLD AUTO: 5.57 K/UL
WHITE BLOOD CELLS URINE: 0 /HPF

## 2022-06-17 LAB
PSA SERPL-MCNC: 0.48 NG/ML
T4 FREE SERPL-MCNC: 1.4 NG/DL
TSH SERPL-ACNC: 2.73 UIU/ML

## 2022-06-20 ENCOUNTER — APPOINTMENT (OUTPATIENT)
Dept: INTERNAL MEDICINE | Facility: CLINIC | Age: 47
End: 2022-06-20
Payer: COMMERCIAL

## 2022-06-20 VITALS — DIASTOLIC BLOOD PRESSURE: 80 MMHG | SYSTOLIC BLOOD PRESSURE: 128 MMHG

## 2022-06-20 VITALS
DIASTOLIC BLOOD PRESSURE: 86 MMHG | SYSTOLIC BLOOD PRESSURE: 122 MMHG | TEMPERATURE: 98 F | HEIGHT: 67 IN | BODY MASS INDEX: 32.33 KG/M2 | WEIGHT: 206 LBS | HEART RATE: 87 BPM | OXYGEN SATURATION: 98 %

## 2022-06-20 DIAGNOSIS — Z00.00 ENCOUNTER FOR GENERAL ADULT MEDICAL EXAMINATION W/OUT ABNORMAL FINDINGS: ICD-10-CM

## 2022-06-20 PROCEDURE — 93000 ELECTROCARDIOGRAM COMPLETE: CPT

## 2022-06-20 PROCEDURE — 99396 PREV VISIT EST AGE 40-64: CPT | Mod: 25

## 2022-06-20 NOTE — PHYSICAL EXAM
[No Acute Distress] : no acute distress [Well Nourished] : well nourished [Well Developed] : well developed [Well-Appearing] : well-appearing [Normal Sclera/Conjunctiva] : normal sclera/conjunctiva [PERRL] : pupils equal round and reactive to light [EOMI] : extraocular movements intact [Normal Outer Ear/Nose] : the outer ears and nose were normal in appearance [Normal Oropharynx] : the oropharynx was normal [No JVD] : no jugular venous distention [No Lymphadenopathy] : no lymphadenopathy [Supple] : supple [Thyroid Normal, No Nodules] : the thyroid was normal and there were no nodules present [No Respiratory Distress] : no respiratory distress  [No Accessory Muscle Use] : no accessory muscle use [Clear to Auscultation] : lungs were clear to auscultation bilaterally [Normal Rate] : normal rate  [Regular Rhythm] : with a regular rhythm [Normal S1, S2] : normal S1 and S2 [No Murmur] : no murmur heard [No Carotid Bruits] : no carotid bruits [No Abdominal Bruit] : a ~M bruit was not heard ~T in the abdomen [No Varicosities] : no varicosities [Pedal Pulses Present] : the pedal pulses are present [No Edema] : there was no peripheral edema [No Palpable Aorta] : no palpable aorta [No Extremity Clubbing/Cyanosis] : no extremity clubbing/cyanosis [Soft] : abdomen soft [Non Tender] : non-tender [Non-distended] : non-distended [No Masses] : no abdominal mass palpated [No HSM] : no HSM [Normal Bowel Sounds] : normal bowel sounds [Rectal Exam - Rectum] : digital rectal exam was normal [Prostate Enlargement] : the prostate was not enlarged [Prostate Tenderness] : the prostate was not tender [No Prostate Nodules] : no prostate nodules [Normal Posterior Cervical Nodes] : no posterior cervical lymphadenopathy [Normal Anterior Cervical Nodes] : no anterior cervical lymphadenopathy [No CVA Tenderness] : no CVA  tenderness [No Spinal Tenderness] : no spinal tenderness [No Joint Swelling] : no joint swelling [Grossly Normal Strength/Tone] : grossly normal strength/tone [No Rash] : no rash [Coordination Grossly Intact] : coordination grossly intact [No Focal Deficits] : no focal deficits [Normal Gait] : normal gait [Deep Tendon Reflexes (DTR)] : deep tendon reflexes were 2+ and symmetric [Normal Affect] : the affect was normal [Normal Insight/Judgement] : insight and judgment were intact

## 2022-06-20 NOTE — ASSESSMENT
[FreeTextEntry1] : \par \par ecg---done in office today for hypertension---SR; WNL\par \par imp---hypertension--normotensive on meds\par          hypercholesterolemia---mildly elevated LDL; off statin\par          obesity----BMI=32.26\par          abnormal LFTs---normal off statin; has mild hepatic steatosis per prior abdominal US\par          asthma---asx.; not using rescue inhaler\par \par plan--FBW reviewed with pt.\par          dietary counseling re: weight loss\par          g.i. consult re: ? resumption of statin tx; ? with different statin\par          annual CPE\par          \par

## 2022-06-20 NOTE — HISTORY OF PRESENT ILLNESS
[FreeTextEntry1] : \par here for CPE [de-identified] : \par hypertension---started on amlodipine 5 mg  about 1 year ago due mildly elevated BPs.\par \par abn. LFTs on statin---remains off statin; consult with Dr. Pinto was deferred.

## 2022-06-20 NOTE — HEALTH RISK ASSESSMENT
[Good] : ~his/her~  mood as  good [Never] : Never [Yes] : Yes [2 - 4 times a month (2 pts)] : 2-4 times a month (2 points) [No falls in past year] : Patient reported no falls in the past year [0] : 2) Feeling down, depressed, or hopeless: Not at all (0) [HIV Test offered] : HIV Test offered [Hepatitis C test offered] : Hepatitis C test offered [None] : None [With Family] : lives with family [# of Members in Household ___] :  household currently consist of [unfilled] member(s) [Employed] : employed [College] : College [] :  [# Of Children ___] : has [unfilled] children [Sexually Active] : sexually active [Feels Safe at Home] : Feels safe at home [Fully functional (bathing, dressing, toileting, transferring, walking, feeding)] : Fully functional (bathing, dressing, toileting, transferring, walking, feeding) [Fully functional (using the telephone, shopping, preparing meals, housekeeping, doing laundry, using] : Fully functional and needs no help or supervision to perform IADLs (using the telephone, shopping, preparing meals, housekeeping, doing laundry, using transportation, managing medications and managing finances) [Reports normal functional visual acuity (ie: able to read med bottle)] : Reports normal functional visual acuity [Smoke Detector] : smoke detector [Carbon Monoxide Detector] : carbon monoxide detector [Seat Belt] :  uses seat belt [Sunscreen] : uses sunscreen [With Patient/Caregiver] : , with patient/caregiver [I will adhere to the patient's wishes.] : I will adhere to the patient's wishes. [de-identified] : none [de-identified] : none [de-identified] : rides indoor bike; walks 4x per week [de-identified] : low carb [TDP6Motgd] : 0 [High Risk Behavior] : no high risk behavior [Reports changes in hearing] : Reports no changes in hearing [Reports changes in vision] : Reports no changes in vision [Reports changes in dental health] : Reports no changes in dental health [Guns at Home] : no guns at home [Travel to Developing Areas] : does not  travel to developing areas [TB Exposure] : is not being exposed to tuberculosis [ColonoscopyDate] : 08/17 [ColonoscopyComments] : 5 yr. f/[id number]` [FreeTextEntry2] :  [AdvancecareDate] : 06/22

## 2022-06-21 ENCOUNTER — NON-APPOINTMENT (OUTPATIENT)
Age: 47
End: 2022-06-21

## 2022-08-06 ENCOUNTER — TRANSCRIPTION ENCOUNTER (OUTPATIENT)
Age: 47
End: 2022-08-06

## 2022-11-11 ENCOUNTER — APPOINTMENT (OUTPATIENT)
Dept: INTERNAL MEDICINE | Facility: CLINIC | Age: 47
End: 2022-11-11

## 2022-11-11 VITALS
WEIGHT: 204 LBS | HEART RATE: 91 BPM | DIASTOLIC BLOOD PRESSURE: 80 MMHG | SYSTOLIC BLOOD PRESSURE: 134 MMHG | HEIGHT: 67 IN | OXYGEN SATURATION: 98 % | BODY MASS INDEX: 32.02 KG/M2

## 2022-11-11 PROCEDURE — 99214 OFFICE O/P EST MOD 30 MIN: CPT

## 2022-11-11 NOTE — ASSESSMENT
[FreeTextEntry1] : imp---hypertension--normotensive on meds\par          hypercholesterolemia---mildly elevated LDL; off statin 2/2 LFTs, will trial rosuvastatin and recheck lipid profile in 8 weeks to monitor LFTs closely \par          obesity----BMI > 30\par          abnormal LFTs---normal off statin; has mild hepatic steatosis per prior abdominal US\par          asthma---asx.; not using rescue inhaler\par \par plan--\par          dietary counseling re: weight loss\par          \par

## 2022-11-11 NOTE — PHYSICAL EXAM
[No Acute Distress] : no acute distress [Well Nourished] : well nourished [Well Developed] : well developed [Well-Appearing] : well-appearing [Normal Sclera/Conjunctiva] : normal sclera/conjunctiva [PERRL] : pupils equal round and reactive to light [EOMI] : extraocular movements intact [Normal Outer Ear/Nose] : the outer ears and nose were normal in appearance [Normal Oropharynx] : the oropharynx was normal [No JVD] : no jugular venous distention [No Lymphadenopathy] : no lymphadenopathy [Supple] : supple [Thyroid Normal, No Nodules] : the thyroid was normal and there were no nodules present [No Respiratory Distress] : no respiratory distress  [No Accessory Muscle Use] : no accessory muscle use [Clear to Auscultation] : lungs were clear to auscultation bilaterally [Normal Rate] : normal rate  [Regular Rhythm] : with a regular rhythm [Normal S1, S2] : normal S1 and S2 [No Murmur] : no murmur heard [No Carotid Bruits] : no carotid bruits [No Abdominal Bruit] : a ~M bruit was not heard ~T in the abdomen [No Varicosities] : no varicosities [Pedal Pulses Present] : the pedal pulses are present [No Edema] : there was no peripheral edema [No Palpable Aorta] : no palpable aorta [No Extremity Clubbing/Cyanosis] : no extremity clubbing/cyanosis [Soft] : abdomen soft [Non Tender] : non-tender [Non-distended] : non-distended [No Masses] : no abdominal mass palpated [No HSM] : no HSM [Normal Bowel Sounds] : normal bowel sounds [Prostate Enlargement] : the prostate was not enlarged [Rectal Exam - Rectum] : digital rectal exam was normal [Prostate Tenderness] : the prostate was not tender [No Prostate Nodules] : no prostate nodules [Normal Posterior Cervical Nodes] : no posterior cervical lymphadenopathy [Normal Anterior Cervical Nodes] : no anterior cervical lymphadenopathy [No CVA Tenderness] : no CVA  tenderness [No Spinal Tenderness] : no spinal tenderness [No Joint Swelling] : no joint swelling [Grossly Normal Strength/Tone] : grossly normal strength/tone [No Rash] : no rash [Coordination Grossly Intact] : coordination grossly intact [No Focal Deficits] : no focal deficits [Normal Gait] : normal gait [Deep Tendon Reflexes (DTR)] : deep tendon reflexes were 2+ and symmetric [Normal Affect] : the affect was normal [Normal Insight/Judgement] : insight and judgment were intact

## 2022-11-11 NOTE — HEALTH RISK ASSESSMENT
[Never] : Never [Yes] : Yes [2 - 4 times a month (2 pts)] : 2-4 times a month (2 points) [No falls in past year] : Patient reported no falls in the past year [0] : 2) Feeling down, depressed, or hopeless: Not at all (0) [With Patient/Caregiver] : , with patient/caregiver [I will adhere to the patient's wishes.] : I will adhere to the patient's wishes. [Good] : ~his/her~  mood as  good [HIV Test offered] : HIV Test offered [Hepatitis C test offered] : Hepatitis C test offered [None] : None [With Family] : lives with family [# of Members in Household ___] :  household currently consist of [unfilled] member(s) [Employed] : employed [College] : College [] :  [# Of Children ___] : has [unfilled] children [Sexually Active] : sexually active [Feels Safe at Home] : Feels safe at home [Fully functional (bathing, dressing, toileting, transferring, walking, feeding)] : Fully functional (bathing, dressing, toileting, transferring, walking, feeding) [Fully functional (using the telephone, shopping, preparing meals, housekeeping, doing laundry, using] : Fully functional and needs no help or supervision to perform IADLs (using the telephone, shopping, preparing meals, housekeeping, doing laundry, using transportation, managing medications and managing finances) [Reports normal functional visual acuity (ie: able to read med bottle)] : Reports normal functional visual acuity [Smoke Detector] : smoke detector [Carbon Monoxide Detector] : carbon monoxide detector [Seat Belt] :  uses seat belt [Sunscreen] : uses sunscreen [de-identified] : none [de-identified] : none [de-identified] : rides indoor bike; walks 4x per week [de-identified] : low carb [[id number]] : 0 [AdvancecareDate] : 06/22 [High Risk Behavior] : no high risk behavior [Reports changes in hearing] : Reports no changes in hearing [Reports changes in vision] : Reports no changes in vision [Reports changes in dental health] : Reports no changes in dental health [Guns at Home] : no guns at home [Travel to Developing Areas] : does not  travel to developing areas [TB Exposure] : is not being exposed to tuberculosis [ColonoscopyDate] : 08/17 [ColonoscopyComments] : 6 yr. [id number] [FreeTextEntry2] :

## 2022-11-11 NOTE — HISTORY OF PRESENT ILLNESS
[de-identified] : \par hypertension--- on amlodipine, lisinopril \par \par hyperlipidemia -- \par abn. LFTs on statin--- remains off statin [FreeTextEntry1] : Patient presents today for follow-up of chronic medical conditions.\par

## 2022-11-11 NOTE — REVIEW OF SYSTEMS
[Patient Intake Form Reviewed] : Patient intake form was reviewed [FreeTextEntry1] : Negative except per HPI/Intake Form\par

## 2022-11-14 LAB
ALBUMIN SERPL ELPH-MCNC: 5 G/DL
ALP BLD-CCNC: 78 U/L
ALT SERPL-CCNC: 28 U/L
ANION GAP SERPL CALC-SCNC: 14 MMOL/L
APO B SERPL-MCNC: 100 MG/DL
AST SERPL-CCNC: 26 U/L
BILIRUB SERPL-MCNC: 0.5 MG/DL
BUN SERPL-MCNC: 15 MG/DL
CALCIUM SERPL-MCNC: 10.4 MG/DL
CHLORIDE SERPL-SCNC: 100 MMOL/L
CHOLEST SERPL-MCNC: 224 MG/DL
CO2 SERPL-SCNC: 24 MMOL/L
CREAT SERPL-MCNC: 0.92 MG/DL
EGFR: 104 ML/MIN/1.73M2
GLUCOSE SERPL-MCNC: 90 MG/DL
HDLC SERPL-MCNC: 59 MG/DL
LDLC SERPL CALC-MCNC: 138 MG/DL
NONHDLC SERPL-MCNC: 164 MG/DL
POTASSIUM SERPL-SCNC: 5.1 MMOL/L
PROT SERPL-MCNC: 7.9 G/DL
SODIUM SERPL-SCNC: 138 MMOL/L
TRIGL SERPL-MCNC: 131 MG/DL

## 2022-12-22 ENCOUNTER — TRANSCRIPTION ENCOUNTER (OUTPATIENT)
Age: 47
End: 2022-12-22

## 2022-12-22 LAB
BASOPHILS # BLD AUTO: 0.04 K/UL
BASOPHILS NFR BLD AUTO: 0.6 %
CHOLEST SERPL-MCNC: 173 MG/DL
EOSINOPHIL # BLD AUTO: 0.08 K/UL
EOSINOPHIL NFR BLD AUTO: 1.3 %
HCT VFR BLD CALC: 49.3 %
HDLC SERPL-MCNC: 71 MG/DL
HGB BLD-MCNC: 16.1 G/DL
IMM GRANULOCYTES NFR BLD AUTO: 0.3 %
LDLC SERPL CALC-MCNC: 85 MG/DL
LYMPHOCYTES # BLD AUTO: 2.61 K/UL
LYMPHOCYTES NFR BLD AUTO: 42.2 %
MAN DIFF?: NORMAL
MCHC RBC-ENTMCNC: 29.9 PG
MCHC RBC-ENTMCNC: 32.7 GM/DL
MCV RBC AUTO: 91.6 FL
MONOCYTES # BLD AUTO: 0.42 K/UL
MONOCYTES NFR BLD AUTO: 6.8 %
NEUTROPHILS # BLD AUTO: 3.02 K/UL
NEUTROPHILS NFR BLD AUTO: 48.8 %
NONHDLC SERPL-MCNC: 102 MG/DL
PLATELET # BLD AUTO: 259 K/UL
RBC # BLD: 5.38 M/UL
RBC # FLD: 13.2 %
TRIGL SERPL-MCNC: 84 MG/DL
WBC # FLD AUTO: 6.19 K/UL

## 2022-12-30 ENCOUNTER — APPOINTMENT (OUTPATIENT)
Dept: INTERNAL MEDICINE | Facility: CLINIC | Age: 47
End: 2022-12-30
Payer: COMMERCIAL

## 2022-12-30 VITALS
BODY MASS INDEX: 31.95 KG/M2 | WEIGHT: 204 LBS | OXYGEN SATURATION: 99 % | SYSTOLIC BLOOD PRESSURE: 140 MMHG | HEART RATE: 77 BPM | DIASTOLIC BLOOD PRESSURE: 80 MMHG

## 2022-12-30 VITALS — DIASTOLIC BLOOD PRESSURE: 70 MMHG | SYSTOLIC BLOOD PRESSURE: 135 MMHG

## 2022-12-30 PROCEDURE — 99214 OFFICE O/P EST MOD 30 MIN: CPT

## 2022-12-30 NOTE — ASSESSMENT
[FreeTextEntry1] : imp---hypertension--normotensive on meds\par          hypercholesterolemia---mildly elevated LDL; off statin 2/2 LFTs, will trial rosuvastatin and recheck lipid profile in 8 weeks to monitor LFTs closely \par          obesity----BMI > 30\par          abnormal LFTs--elevated prior in setting of statins, then normalized after discontinuation - has mild hepatic steatosis per prior abdominal US\par          asthma---asx.; not using rescue inhaler\par \par plan--\par          dietary counseling re: weight loss - will work on portion control\par          \par          \par \par #HTN\par Patient with history of hypertension. \par BP today elevated\par Discussed potentially increase medication regimen - will defer for now\par Patient to continue current regimen as prescribed. Patient instructed to continue monitoring blood pressure at home and to keep a journal. Will continue to monitor patient's bp and adjust his hypertensive regimen as needed.\par \par

## 2022-12-30 NOTE — HISTORY OF PRESENT ILLNESS
[de-identified] : \par hypertension--- on amlodipine, lisinopril \par \par hyperlipidemia -- \par abn. LFTs on statin--- remains off statin [FreeTextEntry1] : Patient presents today for follow-up of chronic medical conditions.\par

## 2022-12-30 NOTE — HEALTH RISK ASSESSMENT
[Never] : Never [Yes] : Yes [2 - 4 times a month (2 pts)] : 2-4 times a month (2 points) [No falls in past year] : Patient reported no falls in the past year [0] : 2) Feeling down, depressed, or hopeless: Not at all (0) [With Patient/Caregiver] : , with patient/caregiver [I will adhere to the patient's wishes.] : I will adhere to the patient's wishes. [Good] : ~his/her~  mood as  good [HIV Test offered] : HIV Test offered [Hepatitis C test offered] : Hepatitis C test offered [None] : None [With Family] : lives with family [# of Members in Household ___] :  household currently consist of [unfilled] member(s) [Employed] : employed [College] : College [] :  [# Of Children ___] : has [unfilled] children [Sexually Active] : sexually active [Feels Safe at Home] : Feels safe at home [Fully functional (bathing, dressing, toileting, transferring, walking, feeding)] : Fully functional (bathing, dressing, toileting, transferring, walking, feeding) [Fully functional (using the telephone, shopping, preparing meals, housekeeping, doing laundry, using] : Fully functional and needs no help or supervision to perform IADLs (using the telephone, shopping, preparing meals, housekeeping, doing laundry, using transportation, managing medications and managing finances) [Reports normal functional visual acuity (ie: able to read med bottle)] : Reports normal functional visual acuity [Smoke Detector] : smoke detector [Carbon Monoxide Detector] : carbon monoxide detector [Seat Belt] :  uses seat belt [Sunscreen] : uses sunscreen [de-identified] : none [de-identified] : none [de-identified] : rides indoor bike; walks 4x per week [de-identified] : low carb [[id number]] : 0 [AdvancecareDate] : 06/22 [High Risk Behavior] : no high risk behavior [Reports changes in hearing] : Reports no changes in hearing [Reports changes in vision] : Reports no changes in vision [Reports changes in dental health] : Reports no changes in dental health [Guns at Home] : no guns at home [Travel to Developing Areas] : does not  travel to developing areas [TB Exposure] : is not being exposed to tuberculosis [ColonoscopyDate] : 08/17 [ColonoscopyComments] : 6 yr. [id number]` [FreeTextEntry2] :

## 2023-01-06 LAB
ALBUMIN SERPL ELPH-MCNC: 4.9 G/DL
ALP BLD-CCNC: 76 U/L
ALT SERPL-CCNC: 44 U/L
ANION GAP SERPL CALC-SCNC: 11 MMOL/L
AST SERPL-CCNC: 26 U/L
BILIRUB SERPL-MCNC: 0.4 MG/DL
BUN SERPL-MCNC: 11 MG/DL
CALCIUM SERPL-MCNC: 9.8 MG/DL
CHLORIDE SERPL-SCNC: 101 MMOL/L
CO2 SERPL-SCNC: 27 MMOL/L
CREAT SERPL-MCNC: 0.82 MG/DL
EGFR: 110 ML/MIN/1.73M2
GLUCOSE SERPL-MCNC: 100 MG/DL
POTASSIUM SERPL-SCNC: 4.4 MMOL/L
PROT SERPL-MCNC: 7.4 G/DL
SODIUM SERPL-SCNC: 140 MMOL/L

## 2023-01-23 ENCOUNTER — TRANSCRIPTION ENCOUNTER (OUTPATIENT)
Age: 48
End: 2023-01-23

## 2023-01-27 ENCOUNTER — APPOINTMENT (OUTPATIENT)
Dept: INTERNAL MEDICINE | Facility: CLINIC | Age: 48
End: 2023-01-27
Payer: COMMERCIAL

## 2023-01-27 PROCEDURE — 99214 OFFICE O/P EST MOD 30 MIN: CPT | Mod: 95

## 2023-01-27 RX ORDER — ATORVASTATIN CALCIUM 20 MG/1
20 TABLET, FILM COATED ORAL DAILY
Qty: 90 | Refills: 3 | Status: DISCONTINUED | COMMUNITY
Start: 2020-06-09 | End: 2023-01-27

## 2023-01-27 NOTE — PHYSICAL EXAM
[de-identified] : Physical exam limited due to nature of telehealth visit - patient in NAD, speaking comfortably in full sentences

## 2023-01-27 NOTE — HISTORY OF PRESENT ILLNESS
[Home] : at home, [unfilled] , at the time of the visit. [Medical Office: ([facility])___] : at the medical office located in  [Verbal consent obtained from patient] : the patient, [unfilled] [de-identified] : \par hypertension--- on amlodipine, lisinopril \par home bp readings persistently elevated - sbp 140s \par \par hyperlipidemia -- \par abn. LFTs on statin in the past - tolerating statin since last visit\par \par otherwise feels well without complaints [FreeTextEntry1] : Patient presents today for follow-up of chronic medical conditions.\par

## 2023-01-27 NOTE — ASSESSMENT
[FreeTextEntry1] : imp---hypertension--as below\par          hypercholesterolemia---as below \par          obesity----BMI > 30\par          abnormal LFTs--elevated prior in setting of statins, then normalized after discontinuation - has mild hepatic steatosis per prior abdominal US\par          asthma---asx.; not using rescue inhaler\par \par plan--\par \par #HTN\par Patient with history of hypertension. \par BP remains elevated per home BP readings\par Will increase lisinopril 10 > 20, amlodipine 5\par Patient to continue current regimen as prescribed. Patient instructed to continue monitoring blood pressure at home and to keep a journal. Will continue to monitor patient's bp and adjust his hypertensive regimen as needed.\par \par #HLD \par lifestyle modification discussed at length\par repeat lipid profile at goal \par given hx of statin induced toxicity - will plan to closely monitor LFTs\par continue statin as prescribed \par

## 2023-05-01 ENCOUNTER — RX RENEWAL (OUTPATIENT)
Age: 48
End: 2023-05-01

## 2023-06-22 ENCOUNTER — TRANSCRIPTION ENCOUNTER (OUTPATIENT)
Age: 48
End: 2023-06-22

## 2023-07-18 ENCOUNTER — TRANSCRIPTION ENCOUNTER (OUTPATIENT)
Age: 48
End: 2023-07-18

## 2023-07-25 ENCOUNTER — TRANSCRIPTION ENCOUNTER (OUTPATIENT)
Age: 48
End: 2023-07-25

## 2023-07-26 LAB
ALBUMIN SERPL ELPH-MCNC: 4.8 G/DL
ALP BLD-CCNC: 79 U/L
ALT SERPL-CCNC: 40 U/L
ANION GAP SERPL CALC-SCNC: 14 MMOL/L
AST SERPL-CCNC: 25 U/L
BILIRUB SERPL-MCNC: 0.4 MG/DL
BUN SERPL-MCNC: 14 MG/DL
CALCIUM SERPL-MCNC: 9.7 MG/DL
CHLORIDE SERPL-SCNC: 102 MMOL/L
CHOLEST SERPL-MCNC: 152 MG/DL
CO2 SERPL-SCNC: 23 MMOL/L
CREAT SERPL-MCNC: 0.99 MG/DL
EGFR: 95 ML/MIN/1.73M2
ESTIMATED AVERAGE GLUCOSE: 108 MG/DL
GLUCOSE SERPL-MCNC: 80 MG/DL
HBA1C MFR BLD HPLC: 5.4 %
HDLC SERPL-MCNC: 47 MG/DL
LDLC SERPL CALC-MCNC: 89 MG/DL
NONHDLC SERPL-MCNC: 105 MG/DL
POTASSIUM SERPL-SCNC: 4.7 MMOL/L
PROT SERPL-MCNC: 6.7 G/DL
PSA SERPL-MCNC: 0.79 NG/ML
SODIUM SERPL-SCNC: 138 MMOL/L
T4 FREE SERPL-MCNC: 1.2 NG/DL
TRIGL SERPL-MCNC: 85 MG/DL
TSH SERPL-ACNC: 2.67 UIU/ML

## 2023-08-07 ENCOUNTER — TRANSCRIPTION ENCOUNTER (OUTPATIENT)
Age: 48
End: 2023-08-07

## 2023-09-23 ENCOUNTER — APPOINTMENT (OUTPATIENT)
Dept: INTERNAL MEDICINE | Facility: CLINIC | Age: 48
End: 2023-09-23
Payer: COMMERCIAL

## 2023-09-23 VITALS
WEIGHT: 201 LBS | TEMPERATURE: 98.1 F | HEIGHT: 67 IN | HEART RATE: 79 BPM | OXYGEN SATURATION: 98 % | DIASTOLIC BLOOD PRESSURE: 98 MMHG | SYSTOLIC BLOOD PRESSURE: 151 MMHG | BODY MASS INDEX: 31.55 KG/M2

## 2023-09-23 VITALS — DIASTOLIC BLOOD PRESSURE: 80 MMHG | SYSTOLIC BLOOD PRESSURE: 132 MMHG

## 2023-09-23 DIAGNOSIS — K21.9 GASTRO-ESOPHAGEAL REFLUX DISEASE W/OUT ESOPHAGITIS: ICD-10-CM

## 2023-09-23 DIAGNOSIS — R79.89 OTHER SPECIFIED ABNORMAL FINDINGS OF BLOOD CHEMISTRY: ICD-10-CM

## 2023-09-23 DIAGNOSIS — E66.9 OBESITY, UNSPECIFIED: ICD-10-CM

## 2023-09-23 PROCEDURE — G0444 DEPRESSION SCREEN ANNUAL: CPT | Mod: 59

## 2023-09-23 PROCEDURE — 99396 PREV VISIT EST AGE 40-64: CPT | Mod: 25

## 2023-09-23 PROCEDURE — 93000 ELECTROCARDIOGRAM COMPLETE: CPT | Mod: 59

## 2023-09-23 PROCEDURE — G0447 BEHAVIOR COUNSEL OBESITY 15M: CPT

## 2023-09-23 RX ORDER — OMEPRAZOLE 20 MG/1
20 TABLET, DELAYED RELEASE ORAL
Qty: 30 | Refills: 3 | Status: DISCONTINUED | COMMUNITY
Start: 2020-11-16 | End: 2023-09-23

## 2024-03-15 ENCOUNTER — RX RENEWAL (OUTPATIENT)
Age: 49
End: 2024-03-15

## 2024-03-15 RX ORDER — AMLODIPINE BESYLATE 5 MG/1
5 TABLET ORAL
Qty: 90 | Refills: 3 | Status: ACTIVE | COMMUNITY
Start: 2021-06-24 | End: 1900-01-01

## 2024-04-04 ENCOUNTER — TRANSCRIPTION ENCOUNTER (OUTPATIENT)
Age: 49
End: 2024-04-04

## 2024-04-05 ENCOUNTER — TRANSCRIPTION ENCOUNTER (OUTPATIENT)
Age: 49
End: 2024-04-05

## 2024-04-12 ENCOUNTER — APPOINTMENT (OUTPATIENT)
Dept: INTERNAL MEDICINE | Facility: CLINIC | Age: 49
End: 2024-04-12
Payer: COMMERCIAL

## 2024-04-12 VITALS
BODY MASS INDEX: 31.71 KG/M2 | HEART RATE: 75 BPM | SYSTOLIC BLOOD PRESSURE: 159 MMHG | OXYGEN SATURATION: 98 % | HEIGHT: 67 IN | TEMPERATURE: 97.9 F | DIASTOLIC BLOOD PRESSURE: 98 MMHG | WEIGHT: 202 LBS

## 2024-04-12 VITALS — DIASTOLIC BLOOD PRESSURE: 72 MMHG | SYSTOLIC BLOOD PRESSURE: 130 MMHG

## 2024-04-12 DIAGNOSIS — I10 ESSENTIAL (PRIMARY) HYPERTENSION: ICD-10-CM

## 2024-04-12 DIAGNOSIS — E78.00 PURE HYPERCHOLESTEROLEMIA, UNSPECIFIED: ICD-10-CM

## 2024-04-12 DIAGNOSIS — J45.909 UNSPECIFIED ASTHMA, UNCOMPLICATED: ICD-10-CM

## 2024-04-12 LAB
ALBUMIN SERPL ELPH-MCNC: 4.8 G/DL
ALP BLD-CCNC: 75 U/L
ALT SERPL-CCNC: 33 U/L
ANION GAP SERPL CALC-SCNC: 12 MMOL/L
AST SERPL-CCNC: 24 U/L
BASOPHILS # BLD AUTO: 0.04 K/UL
BASOPHILS NFR BLD AUTO: 0.7 %
BILIRUB SERPL-MCNC: 0.6 MG/DL
BUN SERPL-MCNC: 14 MG/DL
CALCIUM SERPL-MCNC: 9.9 MG/DL
CHLORIDE SERPL-SCNC: 100 MMOL/L
CHOLEST SERPL-MCNC: 159 MG/DL
CO2 SERPL-SCNC: 25 MMOL/L
CREAT SERPL-MCNC: 1.01 MG/DL
EGFR: 92 ML/MIN/1.73M2
EOSINOPHIL # BLD AUTO: 0.08 K/UL
EOSINOPHIL NFR BLD AUTO: 1.4 %
GLUCOSE SERPL-MCNC: 95 MG/DL
HCT VFR BLD CALC: 43.8 %
HDLC SERPL-MCNC: 65 MG/DL
HGB BLD-MCNC: 15.5 G/DL
IMM GRANULOCYTES NFR BLD AUTO: 0.4 %
LDLC SERPL CALC-MCNC: 79 MG/DL
LYMPHOCYTES # BLD AUTO: 2.25 K/UL
LYMPHOCYTES NFR BLD AUTO: 40.3 %
MAN DIFF?: NORMAL
MCHC RBC-ENTMCNC: 30.4 PG
MCHC RBC-ENTMCNC: 35.4 GM/DL
MCV RBC AUTO: 85.9 FL
MONOCYTES # BLD AUTO: 0.35 K/UL
MONOCYTES NFR BLD AUTO: 6.3 %
NEUTROPHILS # BLD AUTO: 2.84 K/UL
NEUTROPHILS NFR BLD AUTO: 50.9 %
NONHDLC SERPL-MCNC: 94 MG/DL
PLATELET # BLD AUTO: 232 K/UL
POTASSIUM SERPL-SCNC: 4.3 MMOL/L
PROT SERPL-MCNC: 7.1 G/DL
RBC # BLD: 5.1 M/UL
RBC # FLD: 12.7 %
SODIUM SERPL-SCNC: 137 MMOL/L
TRIGL SERPL-MCNC: 77 MG/DL
WBC # FLD AUTO: 5.58 K/UL

## 2024-04-12 PROCEDURE — 99214 OFFICE O/P EST MOD 30 MIN: CPT

## 2024-04-12 PROCEDURE — G2211 COMPLEX E/M VISIT ADD ON: CPT | Mod: NC,1L

## 2024-04-12 NOTE — ASSESSMENT
[FreeTextEntry1] : imp---hypertension--as below          hypercholesterolemia---as below           obesity----BMI > 30          abnormal LFTs--elevated prior in setting of atorvastatin, then normalized after discontinuation - has mild hepatic steatosis per prior abdominal US          asthma---asx.; not using rescue inhaler - only trigger is cats, env allergens   plan--  #HTN Patient with history of hypertension.  Current regimen - lisinopril 20 ---> transition to telmisartan 40, amlodipine 5 Patient to continue current regimen as prescribed. Patient instructed to continue monitoring blood pressure at home and to keep a journal. Will continue to monitor patient's bp and adjust his hypertensive regimen as needed.  #HLD  lifestyle modification discussed at length repeat lipid profile at goal  continue statin as prescribed - rosuvastatin 5

## 2024-04-12 NOTE — COUNSELING
[Potential consequences of obesity discussed] : Potential consequences of obesity discussed [Benefits of weight loss discussed] : Benefits of weight loss discussed [Encouraged to maintain food diary] : Encouraged to maintain food diary [Encouraged to increase physical activity] : Encouraged to increase physical activity [Target Wt Loss Goal ___] : Weight Loss Goals: Target weight loss goal [unfilled] lbs [Weigh Self Weekly] : weigh self weekly [FreeTextEntry4] : 15

## 2024-04-12 NOTE — HEALTH RISK ASSESSMENT
[Good] : ~his/her~  mood as  good [Yes] : Yes [Monthly or less (1 pt)] : Monthly or less (1 point) [1 or 2 (0 pts)] : 1 or 2 (0 points) [Less than monthly (1 pt)] : Less than monthly (1 point) [No] : In the past 12 months have you used drugs other than those required for medical reasons? No [No falls in past year] : Patient reported no falls in the past year [0] : 2) Feeling down, depressed, or hopeless: Not at all (0) [PHQ-2 Negative - No further assessment needed] : PHQ-2 Negative - No further assessment needed [de-identified] : stationary bike [de-identified] : balanced varied diet without restrictions [Never] : Never [Patient reported colonoscopy was normal] : Patient reported colonoscopy was normal [With Family] : lives with family [Employed] : employed [] :  [# Of Children ___] : has [unfilled] children [Sexually Active] : sexually active [High Risk Behavior] : no high risk behavior [Feels Safe at Home] : Feels safe at home [Fully functional (bathing, dressing, toileting, transferring, walking, feeding)] : Fully functional (bathing, dressing, toileting, transferring, walking, feeding) [Fully functional (using the telephone, shopping, preparing meals, housekeeping, doing laundry, using] : Fully functional and needs no help or supervision to perform IADLs (using the telephone, shopping, preparing meals, housekeeping, doing laundry, using transportation, managing medications and managing finances) [Reports changes in hearing] : Reports no changes in hearing [Reports changes in vision] : Reports no changes in vision [ColonoscopyDate] : 8/2017 [ColonoscopyComments] : will think about next year as normal colon  [FreeTextEntry2] :  at [facility]

## 2024-04-25 ENCOUNTER — RX RENEWAL (OUTPATIENT)
Age: 49
End: 2024-04-25

## 2024-04-25 RX ORDER — ROSUVASTATIN CALCIUM 5 MG/1
5 TABLET, FILM COATED ORAL
Qty: 90 | Refills: 3 | Status: ACTIVE | COMMUNITY
Start: 2022-11-11 | End: 1900-01-01

## 2024-05-10 ENCOUNTER — TRANSCRIPTION ENCOUNTER (OUTPATIENT)
Age: 49
End: 2024-05-10

## 2024-06-07 RX ORDER — TELMISARTAN 40 MG/1
40 TABLET ORAL DAILY
Qty: 90 | Refills: 0 | Status: ACTIVE | COMMUNITY
Start: 2024-04-12 | End: 1900-01-01

## 2024-08-31 ENCOUNTER — RX RENEWAL (OUTPATIENT)
Age: 49
End: 2024-08-31

## 2024-09-20 ENCOUNTER — RX RENEWAL (OUTPATIENT)
Age: 49
End: 2024-09-20

## 2024-09-24 ENCOUNTER — APPOINTMENT (OUTPATIENT)
Dept: INTERNAL MEDICINE | Facility: CLINIC | Age: 49
End: 2024-09-24
Payer: COMMERCIAL

## 2024-09-24 VITALS
SYSTOLIC BLOOD PRESSURE: 164 MMHG | DIASTOLIC BLOOD PRESSURE: 101 MMHG | OXYGEN SATURATION: 99 % | WEIGHT: 204 LBS | HEIGHT: 67 IN | BODY MASS INDEX: 32.02 KG/M2 | HEART RATE: 96 BPM

## 2024-09-24 DIAGNOSIS — E66.9 OBESITY, UNSPECIFIED: ICD-10-CM

## 2024-09-24 DIAGNOSIS — J45.909 UNSPECIFIED ASTHMA, UNCOMPLICATED: ICD-10-CM

## 2024-09-24 DIAGNOSIS — I10 ESSENTIAL (PRIMARY) HYPERTENSION: ICD-10-CM

## 2024-09-24 DIAGNOSIS — E78.00 PURE HYPERCHOLESTEROLEMIA, UNSPECIFIED: ICD-10-CM

## 2024-09-24 PROCEDURE — 99214 OFFICE O/P EST MOD 30 MIN: CPT

## 2024-09-24 PROCEDURE — G2211 COMPLEX E/M VISIT ADD ON: CPT | Mod: NC

## 2024-09-24 NOTE — HISTORY OF PRESENT ILLNESS
[FreeTextEntry1] : Patient presents today for follow-up of chronic medical conditions.  [de-identified] : PMHx - HTN, HLD  Follow up for BP  BP home readings have been normal range  Does not have log   home bp readings as of recently have been elevated 150s/90s consistently  patient otherwise asx - feels well overall   hyperlipidemia --  abn. LFTs on statin in the past - tolerating statin since last visit

## 2024-09-24 NOTE — HEALTH RISK ASSESSMENT
[Good] : ~his/her~  mood as  good [Yes] : Yes [Monthly or less (1 pt)] : Monthly or less (1 point) [1 or 2 (0 pts)] : 1 or 2 (0 points) [Less than monthly (1 pt)] : Less than monthly (1 point) [No] : In the past 12 months have you used drugs other than those required for medical reasons? No [No falls in past year] : Patient reported no falls in the past year [0] : 2) Feeling down, depressed, or hopeless: Not at all (0) [PHQ-2 Negative - No further assessment needed] : PHQ-2 Negative - No further assessment needed [Never] : Never [Patient reported colonoscopy was normal] : Patient reported colonoscopy was normal [With Family] : lives with family [Employed] : employed [] :  [# Of Children ___] : has [unfilled] children [Sexually Active] : sexually active [Feels Safe at Home] : Feels safe at home [Fully functional (bathing, dressing, toileting, transferring, walking, feeding)] : Fully functional (bathing, dressing, toileting, transferring, walking, feeding) [Fully functional (using the telephone, shopping, preparing meals, housekeeping, doing laundry, using] : Fully functional and needs no help or supervision to perform IADLs (using the telephone, shopping, preparing meals, housekeeping, doing laundry, using transportation, managing medications and managing finances) [de-identified] : stationary bike [de-identified] : balanced varied diet without restrictions [High Risk Behavior] : no high risk behavior [Reports changes in hearing] : Reports no changes in hearing [Reports changes in vision] : Reports no changes in vision [ColonoscopyDate] : 8/2017 [ColonoscopyComments] : will think about next year as normal colon  [FreeTextEntry2] :  at [facility]

## 2024-09-25 LAB
25(OH)D3 SERPL-MCNC: 26.6 NG/ML
ALBUMIN SERPL ELPH-MCNC: 4.9 G/DL
ALP BLD-CCNC: 88 U/L
ALT SERPL-CCNC: 40 U/L
ANION GAP SERPL CALC-SCNC: 15 MMOL/L
AST SERPL-CCNC: 29 U/L
BASOPHILS # BLD AUTO: 0.04 K/UL
BASOPHILS NFR BLD AUTO: 0.7 %
BILIRUB SERPL-MCNC: 0.5 MG/DL
BUN SERPL-MCNC: 12 MG/DL
CALCIUM SERPL-MCNC: 10 MG/DL
CHLORIDE SERPL-SCNC: 101 MMOL/L
CHOLEST SERPL-MCNC: 165 MG/DL
CO2 SERPL-SCNC: 23 MMOL/L
CREAT SERPL-MCNC: 0.94 MG/DL
EGFR: 100 ML/MIN/1.73M2
EOSINOPHIL # BLD AUTO: 0.04 K/UL
EOSINOPHIL NFR BLD AUTO: 0.7 %
ESTIMATED AVERAGE GLUCOSE: 100 MG/DL
FOLATE SERPL-MCNC: >20 NG/ML
GLUCOSE SERPL-MCNC: 102 MG/DL
HBA1C MFR BLD HPLC: 5.1 %
HCT VFR BLD CALC: 47.8 %
HDLC SERPL-MCNC: 66 MG/DL
HGB BLD-MCNC: 16 G/DL
IMM GRANULOCYTES NFR BLD AUTO: 0.2 %
LDLC SERPL CALC-MCNC: 86 MG/DL
LYMPHOCYTES # BLD AUTO: 1.65 K/UL
LYMPHOCYTES NFR BLD AUTO: 26.8 %
MAN DIFF?: NORMAL
MCHC RBC-ENTMCNC: 29.9 PG
MCHC RBC-ENTMCNC: 33.5 GM/DL
MCV RBC AUTO: 89.3 FL
MONOCYTES # BLD AUTO: 0.31 K/UL
MONOCYTES NFR BLD AUTO: 5 %
NEUTROPHILS # BLD AUTO: 4.1 K/UL
NEUTROPHILS NFR BLD AUTO: 66.6 %
NONHDLC SERPL-MCNC: 100 MG/DL
PLATELET # BLD AUTO: 263 K/UL
POTASSIUM SERPL-SCNC: 5 MMOL/L
PROT SERPL-MCNC: 8.1 G/DL
PSA SERPL-MCNC: 0.45 NG/ML
RBC # BLD: 5.35 M/UL
RBC # FLD: 13 %
SODIUM SERPL-SCNC: 138 MMOL/L
TRIGL SERPL-MCNC: 69 MG/DL
TSH SERPL-ACNC: 2.28 UIU/ML
VIT B12 SERPL-MCNC: 877 PG/ML
WBC # FLD AUTO: 6.15 K/UL

## 2024-12-26 ENCOUNTER — RX RENEWAL (OUTPATIENT)
Age: 49
End: 2024-12-26

## 2025-01-06 ENCOUNTER — APPOINTMENT (OUTPATIENT)
Dept: INTERNAL MEDICINE | Facility: CLINIC | Age: 50
End: 2025-01-06
Payer: COMMERCIAL

## 2025-01-06 ENCOUNTER — NON-APPOINTMENT (OUTPATIENT)
Age: 50
End: 2025-01-06

## 2025-01-06 VITALS
OXYGEN SATURATION: 99 % | DIASTOLIC BLOOD PRESSURE: 70 MMHG | BODY MASS INDEX: 31.83 KG/M2 | HEIGHT: 68 IN | WEIGHT: 210 LBS | HEART RATE: 64 BPM | SYSTOLIC BLOOD PRESSURE: 130 MMHG

## 2025-01-06 DIAGNOSIS — R79.89 OTHER SPECIFIED ABNORMAL FINDINGS OF BLOOD CHEMISTRY: ICD-10-CM

## 2025-01-06 DIAGNOSIS — E78.00 PURE HYPERCHOLESTEROLEMIA, UNSPECIFIED: ICD-10-CM

## 2025-01-06 DIAGNOSIS — I10 ESSENTIAL (PRIMARY) HYPERTENSION: ICD-10-CM

## 2025-01-06 DIAGNOSIS — K21.9 GASTRO-ESOPHAGEAL REFLUX DISEASE W/OUT ESOPHAGITIS: ICD-10-CM

## 2025-01-06 DIAGNOSIS — J45.909 UNSPECIFIED ASTHMA, UNCOMPLICATED: ICD-10-CM

## 2025-01-06 PROCEDURE — 99396 PREV VISIT EST AGE 40-64: CPT

## 2025-01-06 PROCEDURE — G0444 DEPRESSION SCREEN ANNUAL: CPT | Mod: 59

## 2025-01-06 PROCEDURE — 93000 ELECTROCARDIOGRAM COMPLETE: CPT | Mod: 59

## 2025-03-27 ENCOUNTER — RX RENEWAL (OUTPATIENT)
Age: 50
End: 2025-03-27